# Patient Record
Sex: FEMALE | URBAN - METROPOLITAN AREA
[De-identification: names, ages, dates, MRNs, and addresses within clinical notes are randomized per-mention and may not be internally consistent; named-entity substitution may affect disease eponyms.]

---

## 2020-07-08 ENCOUNTER — OFFICE VISIT (OUTPATIENT)
Dept: URBAN - METROPOLITAN AREA TELEHEALTH 2 | Facility: TELEHEALTH | Age: 40
End: 2020-07-08
Payer: COMMERCIAL

## 2020-07-08 DIAGNOSIS — K21.0 GERD WITH ESOPHAGITIS: ICD-10-CM

## 2020-07-08 DIAGNOSIS — K59.01 CONSTIPATION BY DELAYED COLONIC TRANSIT: ICD-10-CM

## 2020-07-08 DIAGNOSIS — K92.1 HEMATOCHEZIA: ICD-10-CM

## 2020-07-08 DIAGNOSIS — B37.3 VAGINAL CANDIDIASIS: ICD-10-CM

## 2020-07-08 PROBLEM — 72934000: Status: ACTIVE | Noted: 2020-07-08

## 2020-07-08 PROBLEM — 449341000124102: Status: ACTIVE | Noted: 2020-07-08

## 2020-07-08 PROBLEM — 266433003: Status: ACTIVE | Noted: 2020-07-08

## 2020-07-08 PROCEDURE — 99205 OFFICE O/P NEW HI 60 MIN: CPT | Performed by: INTERNAL MEDICINE

## 2020-07-08 RX ORDER — HYDROCORTISONE ACETATE 25 MG/1
1 SUPPOSITORY SUPPOSITORY RECTAL TWICE A DAY
Qty: 60 | Refills: 3 | OUTPATIENT
Start: 2020-07-08 | End: 2020-11-04

## 2020-07-08 RX ORDER — FLUCONAZOLE 150 MG/1
1 TABLET TABLET ORAL DAILY
Qty: 1 | Refills: 3 | OUTPATIENT
Start: 2020-07-08 | End: 2020-07-12

## 2020-07-08 RX ORDER — LINACLOTIDE 72 UG/1
1 CAPSULE AT LEAST 30 MINUTES BEFORE THE FIRST MEAL OF THE DAY ON AN EMPTY STOMACH CAPSULE, GELATIN COATED ORAL ONCE A DAY
Qty: 90 | Refills: 3 | OUTPATIENT
Start: 2020-07-08 | End: 2021-07-03

## 2020-07-08 RX ORDER — PANTOPRAZOLE SODIUM 40 MG/1
1 TABLET TABLET, DELAYED RELEASE ORAL ONCE A DAY
Qty: 90 | Refills: 3 | OUTPATIENT
Start: 2020-07-08

## 2020-07-08 NOTE — HPI-OTHER HISTORIES
The pt has a history of lower abdominal pain with constipation for 4 months.  The pt notes that she has had problems with gas and bloating for several years.  She has taken stool softners, gas X and has noticed rectal bleeding. Her water intake is poor and she will skip breakfast and  will eat lunch on the go. She will eat a decent meal for dinner.

## 2020-10-13 ENCOUNTER — OFFICE VISIT (OUTPATIENT)
Dept: URBAN - METROPOLITAN AREA SURGERY CENTER 16 | Facility: SURGERY CENTER | Age: 40
End: 2020-10-13
Payer: COMMERCIAL

## 2020-10-13 DIAGNOSIS — R19.4 ALTERED BOWEL HABITS: ICD-10-CM

## 2020-10-13 PROCEDURE — G8907 PT DOC NO EVENTS ON DISCHARG: HCPCS | Performed by: INTERNAL MEDICINE

## 2020-10-13 PROCEDURE — G9937 DIG OR SURV COLSCO: HCPCS | Performed by: INTERNAL MEDICINE

## 2020-10-13 PROCEDURE — 45378 DIAGNOSTIC COLONOSCOPY: CPT | Performed by: INTERNAL MEDICINE

## 2020-11-17 ENCOUNTER — OFFICE VISIT (OUTPATIENT)
Dept: URBAN - METROPOLITAN AREA SURGERY CENTER 16 | Facility: SURGERY CENTER | Age: 40
End: 2020-11-17
Payer: COMMERCIAL

## 2020-11-17 DIAGNOSIS — K29.60 ADENOPAPILLOMATOSIS GASTRICA: ICD-10-CM

## 2020-11-17 DIAGNOSIS — K22.8 COLUMNAR-LINED ESOPHAGUS: ICD-10-CM

## 2020-11-17 PROCEDURE — G8907 PT DOC NO EVENTS ON DISCHARG: HCPCS | Performed by: INTERNAL MEDICINE

## 2020-11-17 PROCEDURE — 43239 EGD BIOPSY SINGLE/MULTIPLE: CPT | Performed by: INTERNAL MEDICINE

## 2020-11-20 ENCOUNTER — OFFICE VISIT (OUTPATIENT)
Dept: URBAN - METROPOLITAN AREA SURGERY CENTER 16 | Facility: SURGERY CENTER | Age: 40
End: 2020-11-20

## 2020-12-21 ENCOUNTER — OFFICE VISIT (OUTPATIENT)
Dept: URBAN - METROPOLITAN AREA CLINIC 17 | Facility: CLINIC | Age: 40
End: 2020-12-21

## 2021-01-13 ENCOUNTER — OFFICE VISIT (OUTPATIENT)
Dept: URBAN - METROPOLITAN AREA TELEHEALTH 2 | Facility: TELEHEALTH | Age: 41
End: 2021-01-13
Payer: COMMERCIAL

## 2021-01-13 DIAGNOSIS — K59.01 CONSTIPATION BY DELAYED COLONIC TRANSIT: ICD-10-CM

## 2021-01-13 DIAGNOSIS — R19.4 CHANGE IN BOWEL HABITS: ICD-10-CM

## 2021-01-13 DIAGNOSIS — K21.00 GASTROESOPHAGEAL REFLUX DISEASE WITH ESOPHAGITIS WITHOUT HEMORRHAGE: ICD-10-CM

## 2021-01-13 DIAGNOSIS — K92.89 GAS BLOAT SYNDROME: ICD-10-CM

## 2021-01-13 PROBLEM — 35298007: Status: ACTIVE | Noted: 2020-07-08

## 2021-01-13 PROCEDURE — G9903 PT SCRN TBCO ID AS NON USER: HCPCS | Performed by: INTERNAL MEDICINE

## 2021-01-13 PROCEDURE — 99213 OFFICE O/P EST LOW 20 MIN: CPT | Performed by: INTERNAL MEDICINE

## 2021-01-13 PROCEDURE — G8482 FLU IMMUNIZE ORDER/ADMIN: HCPCS | Performed by: INTERNAL MEDICINE

## 2021-01-13 PROCEDURE — G8420 CALC BMI NORM PARAMETERS: HCPCS | Performed by: INTERNAL MEDICINE

## 2021-01-13 PROCEDURE — 1036F TOBACCO NON-USER: CPT | Performed by: INTERNAL MEDICINE

## 2021-01-13 RX ORDER — LINACLOTIDE 72 UG/1
1 CAPSULE AT LEAST 30 MINUTES BEFORE THE FIRST MEAL OF THE DAY ON AN EMPTY STOMACH CAPSULE, GELATIN COATED ORAL ONCE A DAY
Qty: 90 | Refills: 3 | Status: ACTIVE | COMMUNITY
Start: 2020-07-08 | End: 2021-07-03

## 2021-01-13 RX ORDER — PANTOPRAZOLE SODIUM 40 MG/1
1 TABLET TABLET, DELAYED RELEASE ORAL ONCE A DAY
Qty: 90 | Refills: 3 | Status: ACTIVE | COMMUNITY
Start: 2020-07-08

## 2021-01-13 NOTE — HPI-OTHER HISTORIES
The pt presents for a f/u office visit as she notes that she is having heartburn and indigestion as well. She notes that the Linzess works well for constipation but she has not been taking the medication as regularly as she should. She denies melena, hematemesis or hematochezia.

## 2021-11-10 ENCOUNTER — OFFICE VISIT (OUTPATIENT)
Dept: URBAN - METROPOLITAN AREA TELEHEALTH 2 | Facility: TELEHEALTH | Age: 41
End: 2021-11-10

## 2022-05-18 ENCOUNTER — OFFICE VISIT (OUTPATIENT)
Dept: URBAN - METROPOLITAN AREA TELEHEALTH 2 | Facility: TELEHEALTH | Age: 42
End: 2022-05-18
Payer: COMMERCIAL

## 2022-05-18 DIAGNOSIS — R63.8 DIETARY INDISCRETION: ICD-10-CM

## 2022-05-18 DIAGNOSIS — Z12.11 SCREEN FOR COLON CANCER: ICD-10-CM

## 2022-05-18 DIAGNOSIS — K92.89 GAS BLOAT SYNDROME: ICD-10-CM

## 2022-05-18 DIAGNOSIS — E55.9 VITAMIN D DEFICIENCY DISEASE: ICD-10-CM

## 2022-05-18 DIAGNOSIS — K21.00 GASTROESOPHAGEAL REFLUX DISEASE WITH ESOPHAGITIS WITHOUT HEMORRHAGE: ICD-10-CM

## 2022-05-18 PROBLEM — 119292006: Status: ACTIVE | Noted: 2020-07-08

## 2022-05-18 PROBLEM — 34713006: Status: ACTIVE | Noted: 2021-01-13

## 2022-05-18 PROCEDURE — 99214 OFFICE O/P EST MOD 30 MIN: CPT | Performed by: INTERNAL MEDICINE

## 2022-05-18 RX ORDER — PANTOPRAZOLE SODIUM 40 MG/1
1 TABLET TABLET, DELAYED RELEASE ORAL ONCE A DAY
Qty: 90 | Refills: 3 | Status: ACTIVE | COMMUNITY
Start: 2020-07-08

## 2022-05-18 NOTE — HPI-TODAY'S VISIT:
Pt with history of GERD who presents for evaluation of LUQ pain for the several months. She has been drinking milkshakes. For lunch, she will have another shake. For dinner, the pt will eat baked chicken with salad, stir silva with tomatoes, beans, onions over a pasta. She will have a coke 0 and she will beer. The pt has trouble with constipation and she has not been exercising .on a regular basis and she notes that she is eating late at night.

## 2022-06-01 PROBLEM — 305058001: Status: ACTIVE | Noted: 2022-06-01

## 2022-06-01 PROBLEM — 405241002: Status: ACTIVE | Noted: 2022-06-01

## 2022-06-17 ENCOUNTER — TELEPHONE ENCOUNTER (OUTPATIENT)
Dept: URBAN - METROPOLITAN AREA CLINIC 17 | Facility: CLINIC | Age: 42
End: 2022-06-17

## 2022-06-17 RX ORDER — PANTOPRAZOLE SODIUM 40 MG/1
1 TABLET TABLET, DELAYED RELEASE ORAL ONCE A DAY
Qty: 90 | Refills: 3
Start: 2020-07-08

## 2022-06-29 ENCOUNTER — TELEPHONE ENCOUNTER (OUTPATIENT)
Dept: URBAN - METROPOLITAN AREA CLINIC 92 | Facility: CLINIC | Age: 42
End: 2022-06-29

## 2022-06-29 ENCOUNTER — OFFICE VISIT (OUTPATIENT)
Dept: URBAN - METROPOLITAN AREA SURGERY CENTER 16 | Facility: SURGERY CENTER | Age: 42
End: 2022-06-29
Payer: COMMERCIAL

## 2022-06-29 DIAGNOSIS — D12.3 ADENOMA OF TRANSVERSE COLON: ICD-10-CM

## 2022-06-29 DIAGNOSIS — Z12.11 COLON CANCER SCREENING: ICD-10-CM

## 2022-06-29 PROBLEM — 266433003: Status: ACTIVE | Noted: 2022-05-18

## 2022-06-29 PROCEDURE — 45380 COLONOSCOPY AND BIOPSY: CPT | Performed by: INTERNAL MEDICINE

## 2022-06-29 PROCEDURE — G8907 PT DOC NO EVENTS ON DISCHARG: HCPCS | Performed by: INTERNAL MEDICINE

## 2022-06-29 RX ORDER — PANTOPRAZOLE SODIUM 40 MG/1
1 TABLET TABLET, DELAYED RELEASE ORAL ONCE A DAY
Qty: 90 | Refills: 3 | Status: ACTIVE | COMMUNITY
Start: 2020-07-08

## 2023-06-26 ENCOUNTER — OFFICE VISIT (OUTPATIENT)
Dept: URBAN - METROPOLITAN AREA TELEHEALTH 2 | Facility: TELEHEALTH | Age: 43
End: 2023-06-26
Payer: COMMERCIAL

## 2023-06-26 DIAGNOSIS — K57.30 DIVERTICULAR DISEASE OF COLON: ICD-10-CM

## 2023-06-26 DIAGNOSIS — E66.01 MORBID OBESITY: ICD-10-CM

## 2023-06-26 DIAGNOSIS — K59.09 CHRONIC CONSTIPATION: ICD-10-CM

## 2023-06-26 PROBLEM — 733657002: Status: ACTIVE | Noted: 2023-06-26

## 2023-06-26 PROBLEM — 78275009: Status: ACTIVE | Noted: 2023-06-26

## 2023-06-26 PROBLEM — 428283002: Status: ACTIVE | Noted: 2023-06-26

## 2023-06-26 PROBLEM — 238136002: Status: ACTIVE | Noted: 2023-06-26

## 2023-06-26 PROCEDURE — 95800 SLP STDY UNATTENDED: CPT | Performed by: INTERNAL MEDICINE

## 2023-06-26 PROCEDURE — 95805 MULTIPLE SLEEP LATENCY TEST: CPT | Performed by: INTERNAL MEDICINE

## 2023-06-26 PROCEDURE — 95801 SLP STDY UNATND W/ANAL: CPT | Performed by: INTERNAL MEDICINE

## 2023-06-26 PROCEDURE — 99214 OFFICE O/P EST MOD 30 MIN: CPT | Performed by: INTERNAL MEDICINE

## 2023-06-26 PROCEDURE — 95806 SLEEP STUDY UNATT&RESP EFFT: CPT | Performed by: INTERNAL MEDICINE

## 2023-06-26 RX ORDER — PANTOPRAZOLE SODIUM 40 MG/1
1 TABLET TABLET, DELAYED RELEASE ORAL ONCE A DAY
Qty: 90 | Refills: 3 | Status: ACTIVE | COMMUNITY
Start: 2020-07-08

## 2023-06-26 RX ORDER — LUBIPROSTONE 8 UG/1
1 CAPSULE WITH FOOD AND WATER CAPSULE, GELATIN COATED ORAL TWICE A DAY
Qty: 180 CAPSULE | Refills: 3 | OUTPATIENT
Start: 2023-06-26 | End: 2024-06-20

## 2023-06-26 NOTE — HPI-TODAY'S VISIT:
The patient with a history of obesity, diverticular disease of the colon, GERD and non-compliance who presetn for f/u evaluation. The pt has struggled with constipation and bowel irregularity and non-compliance and her last colon + TA. The pt notes that she does not spend time exercising and she notes that she has been helping her grandmother and does not take time out for herself.

## 2024-04-18 ENCOUNTER — OV EP (OUTPATIENT)
Dept: URBAN - METROPOLITAN AREA CLINIC 92 | Facility: CLINIC | Age: 44
End: 2024-04-18
Payer: COMMERCIAL

## 2024-04-18 VITALS
TEMPERATURE: 97.1 F | HEIGHT: 67 IN | SYSTOLIC BLOOD PRESSURE: 140 MMHG | WEIGHT: 267 LBS | DIASTOLIC BLOOD PRESSURE: 83 MMHG | HEART RATE: 70 BPM | BODY MASS INDEX: 41.91 KG/M2

## 2024-04-18 DIAGNOSIS — R10.12 LEFT UPPER QUADRANT PAIN: ICD-10-CM

## 2024-04-18 DIAGNOSIS — R14.0 POSTPRANDIAL BLOATING: ICD-10-CM

## 2024-04-18 DIAGNOSIS — K59.04 CHRONIC IDIOPATHIC CONSTIPATION: ICD-10-CM

## 2024-04-18 DIAGNOSIS — R10.32 LEFT LOWER QUADRANT PAIN: ICD-10-CM

## 2024-04-18 DIAGNOSIS — K21.00 GASTROESOPHAGEAL REFLUX DISEASE WITH ESOPHAGITIS WITHOUT HEMORRHAGE: ICD-10-CM

## 2024-04-18 DIAGNOSIS — D25.9 UTERINE LEIOMYOMA, UNSPECIFIED LOCATION: ICD-10-CM

## 2024-04-18 DIAGNOSIS — Z86.010 PERSONAL HISTORY OF COLONIC POLYPS: ICD-10-CM

## 2024-04-18 DIAGNOSIS — K62.5 RECTAL BLEEDING: ICD-10-CM

## 2024-04-18 PROBLEM — 82934008: Status: ACTIVE | Noted: 2024-04-18

## 2024-04-18 PROCEDURE — 99214 OFFICE O/P EST MOD 30 MIN: CPT

## 2024-04-18 RX ORDER — PLECANATIDE 3 MG/1
1 TABLET TABLET ORAL ONCE A DAY
Qty: 90 TABLET | Refills: 0 | OUTPATIENT
Start: 2024-04-18 | End: 2024-07-17

## 2024-04-18 RX ORDER — POLYETHYLENE GLYCOL 3350, SODIUM SULFATE ANHYDROUS, SODIUM BICARBONATE, SODIUM CHLORIDE, POTASSIUM CHLORIDE 236; 22.74; 6.74; 5.86; 2.97 G/4L; G/4L; G/4L; G/4L; G/4L
AS DIRECTED POWDER, FOR SOLUTION ORAL 1
Qty: 1 | Refills: 0 | OUTPATIENT
Start: 2024-04-18 | End: 2024-04-19

## 2024-04-18 RX ORDER — PANTOPRAZOLE SODIUM 40 MG/1
1 TABLET TABLET, DELAYED RELEASE ORAL ONCE A DAY
Qty: 90 | Refills: 3 | Status: ON HOLD | COMMUNITY
Start: 2020-07-08

## 2024-04-18 RX ORDER — LUBIPROSTONE 8 UG/1
1 CAPSULE WITH FOOD AND WATER CAPSULE, GELATIN COATED ORAL TWICE A DAY
Qty: 180 CAPSULE | Refills: 3 | Status: ON HOLD | COMMUNITY
Start: 2023-06-26 | End: 2024-06-20

## 2024-04-18 RX ORDER — PANTOPRAZOLE SODIUM 40 MG/1
1 TABLET TABLET, DELAYED RELEASE ORAL ONCE A DAY
Qty: 90 TABLET | Refills: 0 | OUTPATIENT
Start: 2024-04-18

## 2024-04-18 NOTE — PHYSICAL EXAM GASTROINTESTINAL
Abdomen,  soft, LLQ and LUQ mild ttp nondistended,  no guarding or rigidity,  no masses palpable,  normal bowel sounds Liver and Spleen,no hepatosplenomegaly

## 2024-04-18 NOTE — HPI-TODAY'S VISIT:
43-year-old female presents today for abd pain.  She is a previous patient of Dr. Young she has a history of abdominal pain and constipation.  She has used Linzess 72 mcg in the past.  She also has history of GERD Last colonoscopy was 6/2022 this demonstrated internal hemorrhoids, 11 mm tubular adenoma was recommended to repeat in 3 years EGD  demonstrated LA grade a esophagitis, small hiatal hernia, chronic gastritis no HP or IM  Patient states that for the past few years she has been having on and off left upper abdominal and left lower quadrant abdominal pain but recently the last 4 months this is worsened.  She states her pain is more constant and daily.  She has associated nausea but no vomiting.  She does have postprandial fullness and has to make herself eat.  she has tried to make dietary changes but this has not helped.  There are no aggravating or alleviating factors.  She did have a CT scan ordered by her primary care earlier this year unsure if this was abdomen and pelvis and this did demonstrate uterine fibroids.  Due to this an MRI of the pelvis was ordered which again demonstrated fibroids that she does have a follow-up visit with GYN at the end of May.  She has not seen a GYN in several years.  She does have heavy menstrual cycles. She has history of chronic constipation and typically has a bowel movement every few days.  If she remembers to take her MiraLAX this does help her bowel movements.  She notes that while she was on Linzess 72 of the past she had to discontinue this due to urgency. She never got the Amitiza prescription from last office visit.  She did note an episode of rectal bleeding 3-4 weeks ago and does have history of hemorrhoids.  She started herself on a natural cream however broke out in hives after this.  She discontinued the cream.  She has been taking MiraLAX more regularly since and this did resolve bleeding.  She is having rectal pain however this resolved as well. She has history of acid reflux but this is currently managed if she sleeps upright.  She wants to take her pantoprazole every few weeks.  She denies any dysphagia or odynophagia.  She does not use any NSAIDs.  She has not any family history of any GI related cancers or conditions.

## 2024-05-24 ENCOUNTER — OUT OF OFFICE VISIT (OUTPATIENT)
Dept: URBAN - METROPOLITAN AREA SURGERY CENTER 16 | Facility: SURGERY CENTER | Age: 44
End: 2024-05-24
Payer: COMMERCIAL

## 2024-05-24 ENCOUNTER — OFFICE VISIT (OUTPATIENT)
Dept: URBAN - METROPOLITAN AREA SURGERY CENTER 16 | Facility: SURGERY CENTER | Age: 44
End: 2024-05-24

## 2024-05-24 DIAGNOSIS — K57.30 COLON, DIVERTICULOSIS: ICD-10-CM

## 2024-05-24 DIAGNOSIS — K29.70 CHRONIC GASTRITIS: ICD-10-CM

## 2024-05-24 PROCEDURE — 00813 ANES UPR LWR GI NDSC PX: CPT | Performed by: ANESTHESIOLOGIST ASSISTANT

## 2024-05-24 PROCEDURE — 00813 ANES UPR LWR GI NDSC PX: CPT | Performed by: ANESTHESIOLOGY

## 2024-06-17 ENCOUNTER — OFFICE VISIT (OUTPATIENT)
Dept: URBAN - METROPOLITAN AREA CLINIC 92 | Facility: CLINIC | Age: 44
End: 2024-06-17

## 2024-06-17 RX ORDER — PANTOPRAZOLE SODIUM 40 MG/1
1 TABLET TABLET, DELAYED RELEASE ORAL ONCE A DAY
Qty: 90 | Refills: 3 | Status: ON HOLD | COMMUNITY
Start: 2020-07-08

## 2024-06-17 RX ORDER — LUBIPROSTONE 8 UG/1
1 CAPSULE WITH FOOD AND WATER CAPSULE, GELATIN COATED ORAL TWICE A DAY
Qty: 180 CAPSULE | Refills: 3 | Status: ON HOLD | COMMUNITY
Start: 2023-06-26 | End: 2024-06-20

## 2024-06-17 RX ORDER — PLECANATIDE 3 MG/1
1 TABLET TABLET ORAL ONCE A DAY
Qty: 90 TABLET | Refills: 0 | Status: ACTIVE | COMMUNITY
Start: 2024-04-18 | End: 2024-07-17

## 2024-06-17 RX ORDER — PANTOPRAZOLE SODIUM 40 MG/1
1 TABLET TABLET, DELAYED RELEASE ORAL ONCE A DAY
Qty: 90 TABLET | Refills: 0 | Status: ACTIVE | COMMUNITY
Start: 2024-04-18

## 2024-06-28 ENCOUNTER — DASHBOARD ENCOUNTERS (OUTPATIENT)
Age: 44
End: 2024-06-28

## 2024-07-05 ENCOUNTER — OFFICE VISIT (OUTPATIENT)
Dept: URBAN - METROPOLITAN AREA CLINIC 92 | Facility: CLINIC | Age: 44
End: 2024-07-05

## 2024-07-05 RX ORDER — PANTOPRAZOLE SODIUM 40 MG/1
1 TABLET TABLET, DELAYED RELEASE ORAL ONCE A DAY
Qty: 90 TABLET | Refills: 0 | Status: ACTIVE | COMMUNITY
Start: 2024-04-18

## 2024-07-05 RX ORDER — PLECANATIDE 3 MG/1
1 TABLET TABLET ORAL ONCE A DAY
Qty: 90 TABLET | Refills: 0 | Status: ACTIVE | COMMUNITY
Start: 2024-04-18 | End: 2024-07-17

## 2024-07-05 RX ORDER — PANTOPRAZOLE SODIUM 40 MG/1
1 TABLET TABLET, DELAYED RELEASE ORAL ONCE A DAY
Qty: 90 TABLET | Refills: 0 | OUTPATIENT

## 2024-07-05 RX ORDER — PLECANATIDE 3 MG/1
1 TABLET TABLET ORAL ONCE A DAY
Qty: 90 TABLET | Refills: 0 | OUTPATIENT

## 2024-07-05 RX ORDER — PANTOPRAZOLE SODIUM 40 MG/1
1 TABLET TABLET, DELAYED RELEASE ORAL ONCE A DAY
Qty: 90 | Refills: 3 | Status: ON HOLD | COMMUNITY
Start: 2020-07-08

## 2024-07-05 NOTE — HPI-TODAY'S VISIT:
43-year-old female presents today for abd pain.  She is a previous patient of Dr. Young she has a history of abdominal pain and constipation.  She has used Linzess 72 mcg in the past.  She also has history of GERD Last colonoscopy was 6/2022 this demonstrated internal hemorrhoids, 11 mm tubular adenoma was recommended to repeat in 3 years EGD  demonstrated LA grade a esophagitis, small hiatal hernia, chronic gastritis no HP or IM  Patient states that for the past few years she has been having on and off left upper abdominal and left lower quadrant abdominal pain but recently the last 4 months this is worsened.  She states her pain is more constant and daily.  She has associated nausea but no vomiting.  She does have postprandial fullness and has to make herself eat.  she has tried to make dietary changes but this has not helped.  There are no aggravating or alleviating factors.  She did have a CT scan ordered by her primary care earlier this year unsure if this was abdomen and pelvis and this did demonstrate uterine fibroids.  Due to this an MRI of the pelvis was ordered which again demonstrated fibroids that she does have a follow-up visit with GYN at the end of May.  She has not seen a GYN in several years.  She does have heavy menstrual cycles. She has history of chronic constipation and typically has a bowel movement every few days.  If she remembers to take her MiraLAX this does help her bowel movements.  She notes that while she was on Linzess 72 of the past she had to discontinue this due to urgency. She never got the Amitiza prescription from last office visit.  She did note an episode of rectal bleeding 3-4 weeks ago and does have history of hemorrhoids.  She started herself on a natural cream however broke out in hives after this.  She discontinued the cream.  She has been taking MiraLAX more regularly since and this did resolve bleeding.  She is having rectal pain however this resolved as well. She has history of acid reflux but this is currently managed if she sleeps upright.  She wants to take her pantoprazole every few weeks.  She denies any dysphagia or odynophagia.  She does not use any NSAIDs.  She has not any family history of any GI related cancers or conditions. Colonoscopy 5- demonstrated internal hemorrhoids, diverticula in left colon otherwise no abnormalities.  Endoscopy same day demonstrated erythematous mucosa in gastric antrum and body She was told to start Trulance for her constipation.  She was also started on pantoprazole 40 mg. MRI 2-8-2024 demonstrated markedly enlarged uterus with numerous fibroids, submucosal lesion at the fundus has mildly different imaging properties and the possibility of superimposed endometrial neoplasm is not included consider a sonohysterography CT scan 9-2023 demonstrated enlarged uterus with numerous uterine masses and fibroids, small fat-containing umbilical hernia.  She saw GYN May 20, 2024 during her Pap she was found to have ASCUS but negative HPV she was recommended to repeat her Pap smear in 3 years.  She was referred to IR to discuss UFE note from IR is not signed.

## 2024-07-22 ENCOUNTER — ERX REFILL RESPONSE (OUTPATIENT)
Dept: URBAN - METROPOLITAN AREA CLINIC 92 | Facility: CLINIC | Age: 44
End: 2024-07-22

## 2024-07-22 RX ORDER — PANTOPRAZOLE 40 MG/1
TAKE 1 TABLET BY MOUTH DAILY TABLET, DELAYED RELEASE ORAL
Qty: 90 TABLET | Refills: 0 | OUTPATIENT

## 2024-07-22 RX ORDER — PANTOPRAZOLE SODIUM 40 MG/1
1 TABLET TABLET, DELAYED RELEASE ORAL ONCE A DAY
Qty: 90 TABLET | Refills: 0 | OUTPATIENT

## 2024-07-22 RX ORDER — PLECANATIDE 3 MG/1
1 TABLET TABLET ORAL ONCE A DAY
Qty: 90 TABLET | Refills: 0 | OUTPATIENT

## 2024-07-22 RX ORDER — PLECANATIDE 3 MG/1
TAKE 1 TABLET BY MOUTH DAILY TABLET ORAL
Qty: 90 TABLET | Refills: 0 | OUTPATIENT

## 2024-07-30 ENCOUNTER — TELEPHONE ENCOUNTER (OUTPATIENT)
Dept: URBAN - METROPOLITAN AREA CLINIC 92 | Facility: CLINIC | Age: 44
End: 2024-07-30

## 2024-07-31 ENCOUNTER — OFFICE VISIT (OUTPATIENT)
Dept: URBAN - METROPOLITAN AREA CLINIC 92 | Facility: CLINIC | Age: 44
End: 2024-07-31
Payer: COMMERCIAL

## 2024-07-31 VITALS
TEMPERATURE: 96.5 F | DIASTOLIC BLOOD PRESSURE: 94 MMHG | HEIGHT: 67 IN | SYSTOLIC BLOOD PRESSURE: 145 MMHG | BODY MASS INDEX: 42.06 KG/M2 | HEART RATE: 82 BPM | WEIGHT: 268 LBS

## 2024-07-31 DIAGNOSIS — Z86.010 PERSONAL HISTORY OF COLONIC POLYPS: ICD-10-CM

## 2024-07-31 DIAGNOSIS — R10.12 LEFT UPPER QUADRANT PAIN: ICD-10-CM

## 2024-07-31 DIAGNOSIS — K59.04 CHRONIC IDIOPATHIC CONSTIPATION: ICD-10-CM

## 2024-07-31 DIAGNOSIS — K62.5 RECTAL BLEEDING: ICD-10-CM

## 2024-07-31 DIAGNOSIS — R14.0 POSTPRANDIAL BLOATING: ICD-10-CM

## 2024-07-31 DIAGNOSIS — K21.00 GASTROESOPHAGEAL REFLUX DISEASE WITH ESOPHAGITIS WITHOUT HEMORRHAGE: ICD-10-CM

## 2024-07-31 DIAGNOSIS — R10.32 LEFT LOWER QUADRANT PAIN: ICD-10-CM

## 2024-07-31 DIAGNOSIS — D25.9 UTERINE LEIOMYOMA, UNSPECIFIED LOCATION: ICD-10-CM

## 2024-07-31 PROCEDURE — 99214 OFFICE O/P EST MOD 30 MIN: CPT

## 2024-07-31 RX ORDER — PANTOPRAZOLE SODIUM 40 MG/1
1 TABLET TABLET, DELAYED RELEASE ORAL ONCE A DAY
Qty: 90 TABLET | Refills: 0 | OUTPATIENT

## 2024-07-31 RX ORDER — PANTOPRAZOLE 40 MG/1
TAKE 1 TABLET BY MOUTH DAILY TABLET, DELAYED RELEASE ORAL
Qty: 90 TABLET | Refills: 0 | Status: ACTIVE | COMMUNITY

## 2024-07-31 RX ORDER — PLECANATIDE 3 MG/1
TAKE 1 TABLET BY MOUTH DAILY TABLET ORAL
Qty: 90 TABLET | Refills: 0 | Status: ACTIVE | COMMUNITY

## 2024-07-31 NOTE — HPI-TODAY'S VISIT:
4/18/24 43-year-old female presents today for abd pain.  She is a previous patient of Dr. Young she has a history of abdominal pain and constipation.  She has used Linzess 72 mcg in the past.  She also has history of GERD Last colonoscopy was 6/2022 this demonstrated internal hemorrhoids, 11 mm tubular adenoma was recommended to repeat in 3 years EGD  demonstrated LA grade a esophagitis, small hiatal hernia, chronic gastritis no HP or IM  Patient states that for the past few years she has been having on and off left upper abdominal and left lower quadrant abdominal pain but recently the last 4 months this is worsened.  She states her pain is more constant and daily.  She has associated nausea but no vomiting.  She does have postprandial fullness and has to make herself eat.  she has tried to make dietary changesbut this has not helped.  There are no aggravating or alleviating factors.  She did have a CT scan ordered by her primary care earlier this year unsure if this was abdomen and pelvis and this did demonstrate uterine fibroids.  Due to this an MRI of the pelvis was ordered which again demonstrated fibroids that she does have a follow-up visit with GYN at the end of May.  She has not seen a GYN in several years.  She does have heavy menstrual cycles. She has history of chronic constipation and typically has a bowel movement every few days.  If she remembers to take her MiraLAX this does help her bowel movements.  She notes that while she was on Linzess 72 of the past she had to discontinue this due to urgency. She never got the Amitiza prescription from last office visit.  She did note an episode of rectal bleeding 3-4 weeks ago and does have history of hemorrhoids.  She started herself on a natural cream however broke out in hives after this.  She discontinued the cream.  She has been taking MiraLAX more regularly since and this did resolve bleeding.  She is having rectal pain however this resolved as well. She has history of acid refluxbut this is currently managed if she sleeps upright.  She wants to take her pantoprazole every few weeks.  She denies any dysphagia or odynophagia.  She does not use any NSAIDs.  She has not any family history of any GI related cancers or conditions.  7/31/24 Colonoscopy 5- demonstrated internal hemorrhoids, diverticula in left colon otherwise no abnormalities.  Endoscopy same day demonstrated erythematous mucosa in gastric antrum and body bx showed chronic gastritis no HP or IM  She was told to start Trulance for her constipation has not been consistent with this. She cannot remember to take it.  She was also started on pantoprazole 40 mg also has not been taking this consistently.  MRI 2-8-2024 demonstrated markedly enlarged uterus with numerous fibroids, submucosal lesion at the fundus has mildly different imaging properties and the possibility of superimposed endometrial neoplasm is not included consider a sonohysterography CT scan 9-2023 demonstrated enlarged uterus with numerous uterine masses and fibroids, small fat-containing umbilical hernia.  She saw GYN May 20, 2024 during her Pap she was found to have ASCUS but negative HPV she was recommended to repeat her Pap smear in 3 years.  She was referred to IR to discuss UFE note she was not able to get this done due to active BVbut this was tx. She has not heard from them.

## 2024-09-11 ENCOUNTER — OFFICE VISIT (OUTPATIENT)
Dept: URBAN - METROPOLITAN AREA CLINIC 92 | Facility: CLINIC | Age: 44
End: 2024-09-11

## 2024-09-11 RX ORDER — PLECANATIDE 3 MG/1
TAKE 1 TABLET BY MOUTH DAILY TABLET ORAL
Qty: 90 TABLET | Refills: 0 | Status: ACTIVE | COMMUNITY

## 2024-09-11 RX ORDER — PANTOPRAZOLE SODIUM 40 MG/1
1 TABLET TABLET, DELAYED RELEASE ORAL ONCE A DAY
Qty: 90 TABLET | Refills: 0 | Status: ACTIVE | COMMUNITY

## 2024-09-11 RX ORDER — PANTOPRAZOLE 40 MG/1
TAKE 1 TABLET BY MOUTH DAILY TABLET, DELAYED RELEASE ORAL
Qty: 90 TABLET | Refills: 0 | Status: ACTIVE | COMMUNITY

## 2024-10-03 ENCOUNTER — OFFICE VISIT (OUTPATIENT)
Dept: URBAN - METROPOLITAN AREA CLINIC 92 | Facility: CLINIC | Age: 44
End: 2024-10-03

## 2024-10-03 RX ORDER — PANTOPRAZOLE SODIUM 40 MG/1
1 TABLET TABLET, DELAYED RELEASE ORAL ONCE A DAY
Qty: 90 TABLET | Refills: 0 | COMMUNITY

## 2024-10-03 RX ORDER — PANTOPRAZOLE SODIUM 40 MG/1
1 TABLET TABLET, DELAYED RELEASE ORAL ONCE A DAY
Qty: 90 TABLET | Refills: 0 | OUTPATIENT

## 2024-10-03 RX ORDER — PANTOPRAZOLE 40 MG/1
TAKE 1 TABLET BY MOUTH DAILY TABLET, DELAYED RELEASE ORAL
Qty: 90 TABLET | Refills: 0 | COMMUNITY

## 2024-10-03 RX ORDER — PLECANATIDE 3 MG/1
TAKE 1 TABLET BY MOUTH DAILY TABLET ORAL
Qty: 90 TABLET | Refills: 0 | COMMUNITY

## 2024-10-28 ENCOUNTER — OFFICE VISIT (OUTPATIENT)
Dept: URBAN - METROPOLITAN AREA CLINIC 92 | Facility: CLINIC | Age: 44
End: 2024-10-28
Payer: COMMERCIAL

## 2024-10-28 VITALS
HEART RATE: 112 BPM | HEIGHT: 67 IN | SYSTOLIC BLOOD PRESSURE: 135 MMHG | DIASTOLIC BLOOD PRESSURE: 76 MMHG | WEIGHT: 256 LBS | TEMPERATURE: 97.1 F | BODY MASS INDEX: 40.18 KG/M2

## 2024-10-28 DIAGNOSIS — R10.32 LEFT LOWER QUADRANT PAIN: ICD-10-CM

## 2024-10-28 DIAGNOSIS — R14.0 POSTPRANDIAL BLOATING: ICD-10-CM

## 2024-10-28 DIAGNOSIS — D25.9 UTERINE LEIOMYOMA, UNSPECIFIED LOCATION: ICD-10-CM

## 2024-10-28 DIAGNOSIS — K21.00 GASTROESOPHAGEAL REFLUX DISEASE WITH ESOPHAGITIS WITHOUT HEMORRHAGE: ICD-10-CM

## 2024-10-28 DIAGNOSIS — K59.04 CHRONIC IDIOPATHIC CONSTIPATION: ICD-10-CM

## 2024-10-28 DIAGNOSIS — K62.5 RECTAL BLEEDING: ICD-10-CM

## 2024-10-28 DIAGNOSIS — Z86.010 PERSONAL HISTORY OF COLONIC POLYPS: ICD-10-CM

## 2024-10-28 DIAGNOSIS — R10.12 LEFT UPPER QUADRANT PAIN: ICD-10-CM

## 2024-10-28 PROCEDURE — 99214 OFFICE O/P EST MOD 30 MIN: CPT

## 2024-10-28 RX ORDER — PLECANATIDE 3 MG/1
TAKE 1 TABLET BY MOUTH DAILY TABLET ORAL
Qty: 90 TABLET | Refills: 0 | Status: ON HOLD | COMMUNITY

## 2024-10-28 RX ORDER — LACTULOSE 10 G/15ML
15ML SOLUTION ORAL ONCE A DAY
Qty: 1350 ML | Refills: 3 | OUTPATIENT
Start: 2024-10-28 | End: 2025-10-23

## 2024-10-28 RX ORDER — PANTOPRAZOLE 40 MG/1
TAKE 1 TABLET BY MOUTH DAILY TABLET, DELAYED RELEASE ORAL
Qty: 90 TABLET | Refills: 0 | Status: ON HOLD | COMMUNITY

## 2024-10-28 RX ORDER — PANTOPRAZOLE SODIUM 40 MG/1
1 TABLET TABLET, DELAYED RELEASE ORAL ONCE A DAY
Qty: 90 TABLET | Refills: 0 | OUTPATIENT

## 2024-10-28 RX ORDER — PANTOPRAZOLE SODIUM 40 MG/1
1 TABLET TABLET, DELAYED RELEASE ORAL ONCE A DAY
Qty: 90 TABLET | Refills: 0 | Status: ACTIVE | COMMUNITY

## 2024-10-28 NOTE — HPI-TODAY'S VISIT:
4/18/24 43-year-old female presents today for abd pain.  She is a previous patient of Dr. Young she has a history of abdominal pain and constipation.  She has used Linzess 72 mcg in the past.  She also has history of GERD Last colonoscopy was 6/2022 this demonstrated internal hemorrhoids, 11 mm tubular adenoma was recommended to repeat in 3 years EGD  demonstrated LA grade a esophagitis, small hiatal hernia, chronic gastritis no HP or IM  Patient states that for the past few years she has been having on and off left upper abdominal and left lower quadrant abdominal pain but recently the last 4 months this is worsened.  She states her pain is more constant and daily.  She has associated nausea but no vomiting.  She does have postprandial fullness and has to make herself eat.  she has tried to make dietary changesbut this has not helped.  There are no aggravating or alleviating factors.  She did have a CT scan ordered by her primary care earlier this year unsure if this was abdomen and pelvis and this did demonstrate uterine fibroids.  Due to this an MRI of the pelvis was ordered which again demonstrated fibroids that she does have a follow-up visit with GYN at the end of May.  She has not seen a GYN in several years.  She does have heavy menstrual cycles. She has history of chronic constipation and typically has a bowel movement every few days.  If she remembers to take her MiraLAX this does help her bowel movements.  She notes that while she was on Linzess 72 of the past she had to discontinue this due to urgency. She never got the Amitiza prescription from last office visit.  She did note an episode of rectal bleeding 3-4 weeks ago and does have history of hemorrhoids.  She started herself on a natural cream however broke out in hives after this.  She discontinued the cream.  She has been taking MiraLAX more regularly since and this did resolve bleeding.  She is having rectal pain however this resolved as well. She has history of acid refluxbut this is currently managed if she sleeps upright.  She wants to take her pantoprazole every few weeks.  She denies any dysphagia or odynophagia.  She does not use any NSAIDs.  She has not any family history of any GI related cancers or conditions.  7/31/24 Colonoscopy 5- demonstrated internal hemorrhoids, diverticula in left colon otherwise no abnormalities.  Endoscopy same day demonstrated erythematous mucosa in gastric antrum and body bx showed chronic gastritis no HP or IM  She was told to start Trulance for her constipation has not been consistent with this. She cannot remember to take it.  She was also started on pantoprazole 40 mg also has not been taking this consistently.  MRI 2-8-2024 demonstrated markedly enlarged uterus with numerous fibroids, submucosal lesion at the fundus has mildly different imaging properties and the possibility of superimposed endometrial neoplasm is not included consider a sonohysterography CT scan 9-2023 demonstrated enlarged uterus with numerous uterine masses and fibroids, small fat-containing umbilical hernia.  She saw GYN May 20, 2024 during her Pap she was found to have ASCUS but negative HPV she was recommended to repeat her Pap smear in 3 years.  She was referred to IR to discuss UFE note she was not able to get this done due to active BVbut this was tx. She has not heard from them.  10/28/24 After our visit pt visited [facility]. Labs 10/25/24 showed hgb 11.6, hct 37.5, mcv 84.1, ferritin 48, iron 48, iron sat 17%. She still has not been able to do UFE. She is ch[place]wick for November  Trulance also caused diarrhea so she d/c this.  She is more consistent in taking ppi which helps her gerd sx.

## 2025-02-28 ENCOUNTER — OFFICE VISIT (OUTPATIENT)
Dept: URBAN - METROPOLITAN AREA CLINIC 92 | Facility: CLINIC | Age: 45
End: 2025-02-28

## 2025-02-28 RX ORDER — LACTULOSE 10 G/15ML
15ML SOLUTION ORAL ONCE A DAY
Qty: 1350 ML | Refills: 3 | OUTPATIENT

## 2025-02-28 RX ORDER — LACTULOSE 10 G/15ML
15ML SOLUTION ORAL ONCE A DAY
Qty: 1350 ML | Refills: 3 | Status: ACTIVE | COMMUNITY
Start: 2024-10-28 | End: 2025-10-23

## 2025-02-28 RX ORDER — PLECANATIDE 3 MG/1
TAKE 1 TABLET BY MOUTH DAILY TABLET ORAL
Qty: 90 TABLET | Refills: 0 | Status: ON HOLD | COMMUNITY

## 2025-02-28 RX ORDER — PANTOPRAZOLE 40 MG/1
TAKE 1 TABLET BY MOUTH DAILY TABLET, DELAYED RELEASE ORAL
Qty: 90 TABLET | Refills: 0 | Status: ON HOLD | COMMUNITY

## 2025-02-28 RX ORDER — PANTOPRAZOLE SODIUM 40 MG/1
1 TABLET TABLET, DELAYED RELEASE ORAL ONCE A DAY
Qty: 90 TABLET | Refills: 0 | Status: ACTIVE | COMMUNITY

## 2025-02-28 RX ORDER — PANTOPRAZOLE SODIUM 40 MG/1
1 TABLET TABLET, DELAYED RELEASE ORAL ONCE A DAY
Qty: 90 TABLET | Refills: 0 | OUTPATIENT

## 2025-02-28 NOTE — HPI-TODAY'S VISIT:
4/18/24 43-year-old female presents today for abd pain.  She is a previous patient of Dr. Young she has a history of abdominal pain and constipation.  She has used Linzess 72 mcg in the past.  She also has history of GERD Last colonoscopy was 6/2022 this demonstrated internal hemorrhoids, 11 mm tubular adenoma was recommended to repeat in 3 years EGD  demonstrated LA grade a esophagitis, small hiatal hernia, chronic gastritis no HP or IM  Patient states that for the past few years she has been having on and off left upper abdominal and left lower quadrant abdominal pain but recently the last 4 months this is worsened.  She states her pain is more constant and daily.  She has associated nausea but no vomiting.  She does have postprandial fullness and has to make herself eat.  she has tried to make dietary changesbut this has not helped.  There are no aggravating or alleviating factors.  She did have a CT scan ordered by her primary care earlier this year unsure if this was abdomen and pelvis and this did demonstrate uterine fibroids.  Due to this an MRI of the pelvis was ordered which again demonstrated fibroids that she does have a follow-up visit with GYN at the end of May.  She has not seen a GYN in several years.  She does have heavy menstrual cycles. She has history of chronic constipation and typically has a bowel movement every few days.  If she remembers to take her MiraLAX this does help her bowel movements.  She notes that while she was on Linzess 72 of the past she had to discontinue this due to urgency. She never got the Amitiza prescription from last office visit.  She did note an episode of rectal bleeding 3-4 weeks ago and does have history of hemorrhoids.  She started herself on a natural cream however broke out in hives after this.  She discontinued the cream.  She has been taking MiraLAX more regularly since and this did resolve bleeding.  She is having rectal pain however this resolved as well. She has history of acid refluxbut this is currently managed if she sleeps upright.  She wants to take her pantoprazole every few weeks.  She denies any dysphagia or odynophagia.  She does not use any NSAIDs.  She has not any family history of any GI related cancers or conditions.  7/31/24 Colonoscopy 5- demonstrated internal hemorrhoids, diverticula in left colon otherwise no abnormalities.  Endoscopy same day demonstrated erythematous mucosa in gastric antrum and body bx showed chronic gastritis no HP or IM  She was told to start Trulance for her constipation has not been consistent with this. She cannot remember to take it.  She was also started on pantoprazole 40 mg also has not been taking this consistently.  MRI 2-8-2024 demonstrated markedly enlarged uterus with numerous fibroids, submucosal lesion at the fundus has mildly different imaging properties and the possibility of superimposed endometrial neoplasm is not included consider a sonohysterography CT scan 9-2023 demonstrated enlarged uterus with numerous uterine masses and fibroids, small fat-containing umbilical hernia.  She saw GYN May 20, 2024 during her Pap she was found to have ASCUS but negative HPV she was recommended to repeat her Pap smear in 3 years.  She was referred to IR to discuss UFE note she was not able to get this done due to active BVbut this was tx. She has not heard from them.  10/28/24 After our visit pt visited [facility]. Labs 10/25/24 showed hgb 11.6, hct 37.5, mcv 84.1, ferritin 48, iron 48, iron sat 17%. She still has not been able to do UFE. She is ch[place]wick for November  Trulance also caused diarrhea so she d/c this.  She is more consistent in taking ppi which helps her gerd sx.

## 2025-04-04 ENCOUNTER — OFFICE VISIT (OUTPATIENT)
Dept: URBAN - METROPOLITAN AREA CLINIC 92 | Facility: CLINIC | Age: 45
End: 2025-04-04

## 2025-04-04 RX ORDER — PLECANATIDE 3 MG/1
TAKE 1 TABLET BY MOUTH DAILY TABLET ORAL
Qty: 90 TABLET | Refills: 0 | Status: ON HOLD | COMMUNITY

## 2025-04-04 RX ORDER — PANTOPRAZOLE SODIUM 40 MG/1
1 TABLET TABLET, DELAYED RELEASE ORAL ONCE A DAY
Qty: 90 TABLET | Refills: 0 | Status: ACTIVE | COMMUNITY

## 2025-04-04 RX ORDER — PANTOPRAZOLE 40 MG/1
TAKE 1 TABLET BY MOUTH DAILY TABLET, DELAYED RELEASE ORAL
Qty: 90 TABLET | Refills: 0 | Status: ON HOLD | COMMUNITY

## 2025-04-04 RX ORDER — LACTULOSE 10 G/15ML
15ML SOLUTION ORAL ONCE A DAY
Qty: 1350 ML | Refills: 3 | OUTPATIENT
Start: 2025-04-03

## 2025-04-04 RX ORDER — PANTOPRAZOLE SODIUM 40 MG/1
1 TABLET TABLET, DELAYED RELEASE ORAL ONCE A DAY
Qty: 90 TABLET | Refills: 0 | OUTPATIENT
Start: 2025-04-03

## 2025-04-04 RX ORDER — LACTULOSE 10 G/15ML
15ML SOLUTION ORAL ONCE A DAY
Qty: 1350 ML | Refills: 3 | Status: ACTIVE | COMMUNITY

## 2025-04-04 NOTE — HPI-TODAY'S VISIT:
4/18/24 43-year-old female presents today for abd pain.  She is a previous patient of Dr. Young she has a history of abdominal pain and constipation.  She has used Linzess 72 mcg in the past.  She also has history of GERD Last colonoscopy was 6/2022 this demonstrated internal hemorrhoids, 11 mm tubular adenoma was recommended to repeat in 3 years EGD  demonstrated LA grade a esophagitis, small hiatal hernia, chronic gastritis no HP or IM  Patient states that for the past few years she has been having on and off left upper abdominal and left lower quadrant abdominal pain but recently the last 4 months this is worsened.  She states her pain is more constant and daily.  She has associated nausea but no vomiting.  She does have postprandial fullness and has to make herself eat.  she has tried to make dietary changesbut this has not helped.  There are no aggravating or alleviating factors.  She did have a CT scan ordered by her primary care earlier this year unsure if this was abdomen and pelvis and this did demonstrate uterine fibroids.  Due to this an MRI of the pelvis was ordered which again demonstrated fibroids that she does have a follow-up visit with GYN at the end of May.  She has not seen a GYN in several years.  She does have heavy menstrual cycles. She has history of chronic constipation and typically has a bowel movement every few days.  If she remembers to take her MiraLAX this does help her bowel movements.  She notes that while she was on Linzess 72 of the past she had to discontinue this due to urgency. She never got the Amitiza prescription from last office visit.  She did note an episode of rectal bleeding 3-4 weeks ago and does have history of hemorrhoids.  She started herself on a natural cream however broke out in hives after this.  She discontinued the cream.  She has been taking MiraLAX more regularly since and this did resolve bleeding.  She is having rectal pain however this resolved as well. She has history of acid refluxbut this is currently managed if she sleeps upright.  She wants to take her pantoprazole every few weeks.  She denies any dysphagia or odynophagia.  She does not use any NSAIDs.  She has not any family history of any GI related cancers or conditions.  7/31/24 Colonoscopy 5- demonstrated internal hemorrhoids, diverticula in left colon otherwise no abnormalities.  Endoscopy same day demonstrated erythematous mucosa in gastric antrum and body bx showed chronic gastritis no HP or IM  She was told to start Trulance for her constipation has not been consistent with this. She cannot remember to take it.  She was also started on pantoprazole 40 mg also has not been taking this consistently.  MRI 2-8-2024 demonstrated markedly enlarged uterus with numerous fibroids, submucosal lesion at the fundus has mildly different imaging properties and the possibility of superimposed endometrial neoplasm is not included consider a sonohysterography CT scan 9-2023 demonstrated enlarged uterus with numerous uterine masses and fibroids, small fat-containing umbilical hernia.  She saw GYN May 20, 2024 during her Pap she was found to have ASCUS but negative HPV she was recommended to repeat her Pap smear in 3 years.  She was referred to IR to discuss UFE note she was not able to get this done due to active BVbut this was tx. She has not heard from them.  10/28/24 After our visit pt visited [facility]. Labs 10/25/24 showed hgb 11.6, hct 37.5, mcv 84.1, ferritin 48, iron 48, iron sat 17%. She still has not been able to do UFE. She is [place] for November  Trulance also caused diarrhea so she d/c this.  She is more consistent in taking ppi which helps her gerd sx.

## 2025-04-16 ENCOUNTER — OFFICE VISIT (OUTPATIENT)
Dept: URBAN - METROPOLITAN AREA CLINIC 92 | Facility: CLINIC | Age: 45
End: 2025-04-16
Payer: COMMERCIAL

## 2025-04-16 DIAGNOSIS — R10.12 LEFT UPPER QUADRANT PAIN: ICD-10-CM

## 2025-04-16 DIAGNOSIS — R14.0 POSTPRANDIAL BLOATING: ICD-10-CM

## 2025-04-16 DIAGNOSIS — Z86.0100 HISTORY OF COLON POLYPS: ICD-10-CM

## 2025-04-16 DIAGNOSIS — K21.00 GASTROESOPHAGEAL REFLUX DISEASE WITH ESOPHAGITIS WITHOUT HEMORRHAGE: ICD-10-CM

## 2025-04-16 DIAGNOSIS — R10.32 LEFT LOWER QUADRANT PAIN: ICD-10-CM

## 2025-04-16 DIAGNOSIS — D25.9 UTERINE LEIOMYOMA, UNSPECIFIED LOCATION: ICD-10-CM

## 2025-04-16 DIAGNOSIS — K59.04 CHRONIC IDIOPATHIC CONSTIPATION: ICD-10-CM

## 2025-04-16 DIAGNOSIS — K62.5 RECTAL BLEEDING: ICD-10-CM

## 2025-04-16 PROCEDURE — 99213 OFFICE O/P EST LOW 20 MIN: CPT

## 2025-04-16 RX ORDER — PANTOPRAZOLE SODIUM 40 MG/1
1 TABLET TABLET, DELAYED RELEASE ORAL ONCE A DAY
Qty: 90 TABLET | Refills: 0 | Status: ACTIVE | COMMUNITY
Start: 2025-04-03

## 2025-04-16 RX ORDER — PLECANATIDE 3 MG/1
TAKE 1 TABLET BY MOUTH DAILY TABLET ORAL
Qty: 90 TABLET | Refills: 0 | Status: ACTIVE | COMMUNITY

## 2025-04-16 RX ORDER — LACTULOSE 10 G/15ML
15ML SOLUTION ORAL ONCE A DAY
Qty: 1350 ML | Refills: 3 | Status: ACTIVE | COMMUNITY
Start: 2025-04-03

## 2025-04-16 RX ORDER — LACTULOSE 10 G/15ML
15ML SOLUTION ORAL ONCE A DAY
Qty: 1350 ML | Refills: 3 | OUTPATIENT
Start: 2025-04-16

## 2025-04-16 RX ORDER — PANTOPRAZOLE SODIUM 40 MG/1
1 TABLET TABLET, DELAYED RELEASE ORAL ONCE A DAY
Qty: 90 TABLET | Refills: 3 | OUTPATIENT
Start: 2025-04-16

## 2025-04-16 NOTE — HPI-TODAY'S VISIT:
4/18/24 43-year-old female presents today for abd pain.  She is a previous patient of Dr. Young she has a history of abdominal pain and constipation.  She has used Linzess 72 mcg in the past.  She also has history of GERD Last colonoscopy was 6/2022 this demonstrated internal hemorrhoids, 11 mm tubular adenoma was recommended to repeat in 3 years EGD  demonstrated LA grade a esophagitis, small hiatal hernia, chronic gastritis no HP or IM  Patient states that for the past few years she has been having on and off left upper abdominal and left lower quadrant abdominal pain but recently the last 4 months this is worsened.  She states her pain is more constant and daily.  She has associated nausea but no vomiting.  She does have postprandial fullness and has to make herself eat.  she has tried to make dietary changesbut this has not helped.  There are no aggravating or alleviating factors.  She did have a CT scan ordered by her primary care earlier this year unsure if this was abdomen and pelvis and this did demonstrate uterine fibroids.  Due to this an MRI of the pelvis was ordered which again demonstrated fibroids that she does have a follow-up visit with GYN at the end of May.  She has not seen a GYN in several years.  She does have heavy menstrual cycles. She has history of chronic constipation and typically has a bowel movement every few days.  If she remembers to take her MiraLAX this does help her bowel movements.  She notes that while she was on Linzess 72 of the past she had to discontinue this due to urgency. She never got the Amitiza prescription from last office visit.  She did note an episode of rectal bleeding 3-4 weeks ago and does have history of hemorrhoids.  She started herself on a natural cream however broke out in hives after this.  She discontinued the cream.  She has been taking MiraLAX more regularly since and this did resolve bleeding.  She is having rectal pain however this resolved as well. She has history of acid refluxbut this is currently managed if she sleeps upright.  She wants to take her pantoprazole every few weeks.  She denies any dysphagia or odynophagia.  She does not use any NSAIDs.  She has not any family history of any GI related cancers or conditions.  7/31/24 Colonoscopy 5- demonstrated internal hemorrhoids, diverticula in left colon otherwise no abnormalities.  Endoscopy same day demonstrated erythematous mucosa in gastric antrum and body bx showed chronic gastritis no HP or IM  She was told to start Trulance for her constipation has not been consistent with this. She cannot remember to take it.  She was also started on pantoprazole 40 mg also has not been taking this consistently.  MRI 2-8-2024 demonstrated markedly enlarged uterus with numerous fibroids, submucosal lesion at the fundus has mildly different imaging properties and the possibility of superimposed endometrial neoplasm is not included consider a sonohysterography CT scan 9-2023 demonstrated enlarged uterus with numerous uterine masses and fibroids, small fat-containing umbilical hernia.  She saw GYN May 20, 2024 during her Pap she was found to have ASCUS but negative HPV she was recommended to repeat her Pap smear in 3 years.  She was referred to IR to discuss UFE note she was not able to get this done due to active BVbut this was tx. She has not heard from them.  10/28/24 After our visit pt visited [facility]. Labs 10/25/24 showed hgb 11.6, hct 37.5, mcv 84.1, ferritin 48, iron 48, iron sat 17%. She still has not been able to do UFE. She is chadwick for November  Trulance also caused diarrhea so she d/c this.  She is more consistent in taking ppi which helps her gerd sx.  4/16/25 Since last visit she has been taking Lactaid pills daily instead of lactulose. She had thought I had said to do this. Still constipated. She had a UFE in nov 2024. Doing well but still has pelvic pain. GYN told her she had several large fibroids.

## 2025-06-06 ENCOUNTER — OFFICE VISIT (OUTPATIENT)
Dept: URBAN - METROPOLITAN AREA CLINIC 92 | Facility: CLINIC | Age: 45
End: 2025-06-06

## 2025-06-06 RX ORDER — PANTOPRAZOLE SODIUM 40 MG/1
1 TABLET TABLET, DELAYED RELEASE ORAL ONCE A DAY
Qty: 90 TABLET | Refills: 3 | OUTPATIENT
Start: 2025-06-05

## 2025-06-06 RX ORDER — LACTULOSE 10 G/15ML
15ML SOLUTION ORAL ONCE A DAY
Qty: 1350 ML | Refills: 3 | OUTPATIENT
Start: 2025-06-05

## 2025-06-06 RX ORDER — PANTOPRAZOLE SODIUM 40 MG/1
1 TABLET TABLET, DELAYED RELEASE ORAL ONCE A DAY
Qty: 90 TABLET | Refills: 3 | Status: ACTIVE | COMMUNITY
Start: 2025-04-16

## 2025-06-06 RX ORDER — PLECANATIDE 3 MG/1
TAKE 1 TABLET BY MOUTH DAILY TABLET ORAL
Qty: 90 TABLET | Refills: 0 | Status: ACTIVE | COMMUNITY

## 2025-06-06 RX ORDER — LACTULOSE 10 G/15ML
15ML SOLUTION ORAL ONCE A DAY
Qty: 1350 ML | Refills: 3 | Status: ACTIVE | COMMUNITY
Start: 2025-04-16

## 2025-06-06 NOTE — HPI-TODAY'S VISIT:
4/18/24 43-year-old female presents today for abd pain.  She is a previous patient of Dr. Young she has a history of abdominal pain and constipation.  She has used Linzess 72 mcg in the past.  She also has history of GERD Last colonoscopy was 6/2022 this demonstrated internal hemorrhoids, 11 mm tubular adenoma was recommended to repeat in 3 years EGD  demonstrated LA grade a esophagitis, small hiatal hernia, chronic gastritis no HP or IM  Patient states that for the past few years she has been having on and off left upper abdominal and left lower quadrant abdominal pain but recently the last 4 months this is worsened.  She states her pain is more constant and daily.  She has associated nausea but no vomiting.  She does have postprandial fullness and has to make herself eat.  she has tried to make dietary changesbut this has not helped.  There are no aggravating or alleviating factors.  She did have a CT scan ordered by her primary care earlier this year unsure if this was abdomen and pelvis and this did demonstrate uterine fibroids.  Due to this an MRI of the pelvis was ordered which again demonstrated fibroids that she does have a follow-up visit with GYN at the end of May.  She has not seen a GYN in several years.  She does have heavy menstrual cycles. She has history of chronic constipation and typically has a bowel movement every few days.  If she remembers to take her MiraLAX this does help her bowel movements.  She notes that while she was on Linzess 72 of the past she had to discontinue this due to urgency. She never got the Amitiza prescription from last office visit.  She did note an episode of rectal bleeding 3-4 weeks ago and does have history of hemorrhoids.  She started herself on a natural cream however broke out in hives after this.  She discontinued the cream.  She has been taking MiraLAX more regularly since and this did resolve bleeding.  She is having rectal pain however this resolved as well. She has history of acid refluxbut this is currently managed if she sleeps upright.  She wants to take her pantoprazole every few weeks.  She denies any dysphagia or odynophagia.  She does not use any NSAIDs.  She has not any family history of any GI related cancers or conditions.  7/31/24 Colonoscopy 5- demonstrated internal hemorrhoids, diverticula in left colon otherwise no abnormalities.  Endoscopy same day demonstrated erythematous mucosa in gastric antrum and body bx showed chronic gastritis no HP or IM  She was told to start Trulance for her constipation has not been consistent with this. She cannot remember to take it.  She was also started on pantoprazole 40 mg also has not been taking this consistently.  MRI 2-8-2024 demonstrated markedly enlarged uterus with numerous fibroids, submucosal lesion at the fundus has mildly different imaging properties and the possibility of superimposed endometrial neoplasm is not included consider a sonohysterography CT scan 9-2023 demonstrated enlarged uterus with numerous uterine masses and fibroids, small fat-containing umbilical hernia.  She saw GYN May 20, 2024 during her Pap she was found to have ASCUS but negative HPV she was recommended to repeat her Pap smear in 3 years.  She was referred to IR to discuss UFE note she was not able to get this done due to active BVbut this was tx. She has not heard from them.  10/28/24 After our visit pt visited [facility]. Labs 10/25/24 showed hgb 11.6, hct 37.5, mcv 84.1, ferritin 48, iron 48, iron sat 17%. She still has not been able to do UFE. She is chadwick for November  Trulance also caused diarrhea so she d/c this.  She is more consistent in taking ppi which helps her gerd sx.  4/16/25 Since last visit she has been taking Lactaid pills daily instead of lactulose. She had thought I had said to do this. Still constipated. She had a UFE in nov 2024. Doing well but still has pelvic pain. GYN told her she had several large fibroids.

## 2025-07-07 ENCOUNTER — OFFICE VISIT (OUTPATIENT)
Dept: URBAN - METROPOLITAN AREA CLINIC 92 | Facility: CLINIC | Age: 45
End: 2025-07-07
Payer: COMMERCIAL

## 2025-07-07 DIAGNOSIS — R10.12 LEFT UPPER QUADRANT PAIN: ICD-10-CM

## 2025-07-07 DIAGNOSIS — Z86.0100 HISTORY OF COLON POLYPS: ICD-10-CM

## 2025-07-07 DIAGNOSIS — R10.11 RIGHT UPPER QUADRANT PAIN: ICD-10-CM

## 2025-07-07 DIAGNOSIS — R10.32 LEFT LOWER QUADRANT PAIN: ICD-10-CM

## 2025-07-07 DIAGNOSIS — K21.00 GASTROESOPHAGEAL REFLUX DISEASE WITH ESOPHAGITIS WITHOUT HEMORRHAGE: ICD-10-CM

## 2025-07-07 DIAGNOSIS — K59.04 CHRONIC IDIOPATHIC CONSTIPATION: ICD-10-CM

## 2025-07-07 DIAGNOSIS — K62.5 RECTAL BLEEDING: ICD-10-CM

## 2025-07-07 DIAGNOSIS — K64.8 INTERNAL HEMORRHOIDS: ICD-10-CM

## 2025-07-07 DIAGNOSIS — D25.9 UTERINE LEIOMYOMA, UNSPECIFIED LOCATION: ICD-10-CM

## 2025-07-07 DIAGNOSIS — R14.0 POSTPRANDIAL BLOATING: ICD-10-CM

## 2025-07-07 PROCEDURE — 99214 OFFICE O/P EST MOD 30 MIN: CPT

## 2025-07-07 RX ORDER — LACTULOSE 10 G/15ML
15ML SOLUTION ORAL ONCE A DAY
Qty: 1350 ML | Refills: 3 | Status: ACTIVE | COMMUNITY
Start: 2025-06-05

## 2025-07-07 RX ORDER — TRANEXAMIC ACID 650 MG/1
AS DIRECTED TABLET, FILM COATED ORAL
Status: ACTIVE | COMMUNITY

## 2025-07-07 RX ORDER — LACTULOSE 10 G/15ML
15ML SOLUTION ORAL ONCE A DAY
Qty: 1350 ML | Refills: 3 | OUTPATIENT
Start: 2025-07-03

## 2025-07-07 RX ORDER — PANTOPRAZOLE SODIUM 40 MG/1
1 TABLET TABLET, DELAYED RELEASE ORAL ONCE A DAY
Qty: 90 TABLET | Refills: 3 | Status: ACTIVE | COMMUNITY
Start: 2025-06-05

## 2025-07-07 RX ORDER — HYDROCORTISONE 25 MG/G
1 APPLICATION CREAM TOPICAL TWICE A DAY
Qty: 1 | Refills: 3 | OUTPATIENT
Start: 2025-07-07 | End: 2025-09-01

## 2025-07-07 RX ORDER — METHIMAZOLE 10 MG/1
1 TABLET TABLET ORAL ONCE A DAY
Status: ACTIVE | COMMUNITY

## 2025-07-07 RX ORDER — PANTOPRAZOLE SODIUM 40 MG/1
1 TABLET TABLET, DELAYED RELEASE ORAL ONCE A DAY
Qty: 90 TABLET | Refills: 3 | OUTPATIENT
Start: 2025-07-03

## 2025-07-07 RX ORDER — PROPRANOLOL HYDROCHLORIDE 20 MG/1
1 TABLET TABLET ORAL TWICE A DAY
Status: ACTIVE | COMMUNITY

## 2025-07-07 RX ORDER — PLECANATIDE 3 MG/1
TAKE 1 TABLET BY MOUTH DAILY TABLET ORAL
Qty: 90 TABLET | Refills: 0 | Status: DISCONTINUED | COMMUNITY

## 2025-07-07 NOTE — HPI-TODAY'S VISIT:
4/18/24 43-year-old female presents today for abd pain.  She is a previous patient of Dr. Young she has a history of abdominal pain and constipation.  She has used Linzess 72 mcg in the past.  She also has history of GERD Last colonoscopy was 6/2022 this demonstrated internal hemorrhoids, 11 mm tubular adenoma was recommended to repeat in 3 years EGD  demonstrated LA grade a esophagitis, small hiatal hernia, chronic gastritis no HP or IM  Patient states that for the past few years she has been having on and off left upper abdominal and left lower quadrant abdominal pain but recently the last 4 months this is worsened.  She states her pain is more constant and daily.  She has associated nausea but no vomiting.  She does have postprandial fullness and has to make herself eat.  she has tried to make dietary changesbut this has not helped.  There are no aggravating or alleviating factors.  She did have a CT scan ordered by her primary care earlier this year unsure if this was abdomen and pelvis and this did demonstrate uterine fibroids.  Due to this an MRI of the pelvis was ordered which again demonstrated fibroids that she does have a follow-up visit with GYN at the end of May.  She has not seen a GYN in several years.  She does have heavy menstrual cycles. She has history of chronic constipation and typically has a bowel movement every few days.  If she remembers to take her MiraLAX this does help her bowel movements.  She notes that while she was on Linzess 72 of the past she had to discontinue this due to urgency. She never got the Amitiza prescription from last office visit.  She did note an episode of rectal bleeding 3-4 weeks ago and does have history of hemorrhoids.  She started herself on a natural cream however broke out in hives after this.  She discontinued the cream.  She has been taking MiraLAX more regularly since and this did resolve bleeding.  She is having rectal pain however this resolved as well. She has history of acid refluxbut this is currently managed if she sleeps upright.  She wants to take her pantoprazole every few weeks.  She denies any dysphagia or odynophagia.  She does not use any NSAIDs.  She has not any family history of any GI related cancers or conditions.  7/31/24 Colonoscopy 5- demonstrated internal hemorrhoids, diverticula in left colon otherwise no abnormalities.  Endoscopy same day demonstrated erythematous mucosa in gastric antrum and body bx showed chronic gastritis no HP or IM  She was told to start Trulance for her constipation has not been consistent with this. She cannot remember to take it.  She was also started on pantoprazole 40 mg also has not been taking this consistently.  MRI 2-8-2024 demonstrated markedly enlarged uterus with numerous fibroids, submucosal lesion at the fundus has mildly different imaging properties and the possibility of superimposed endometrial neoplasm is not included consider a sonohysterography CT scan 9-2023 demonstrated enlarged uterus with numerous uterine masses and fibroids, small fat-containing umbilical hernia.  She saw GYN May 20, 2024 during her Pap she was found to have ASCUS but negative HPV she was recommended to repeat her Pap smear in 3 years.  She was referred to IR to discuss UFE note she was not able to get this done due to active BVbut this was tx. She has not heard from them.  10/28/24 After our visit pt visited [facility]. Labs 10/25/24 showed hgb 11.6, hct 37.5, mcv 84.1, ferritin 48, iron 48, iron sat 17%. She still has not been able to do UFE. She is chadwick for November  Trulance also caused diarrhea so she d/c this.  She is more consistent in taking ppi which helps her gerd sx.  4/16/25 Since last visit she has been taking Lactaid pills daily instead of lactulose. She had thought I had said to do this. Still constipated. She had a UFE in nov 2024. Doing well but still has pelvic pain. GYN told her she had several large fibroids.  7/7/25 Has been taking lactulose when she is constipated. If she takes it daily has the diarrhea. Hemorhoids have been irritating her and using prep h which isnt helping.  She did have 1 iron infusion recently. Has noted RUQ abd pain. Drinks 2 shots daily of alcohol she has been doing this for 15 yrs. If she d/c alcohol her pain is better.

## 2025-07-21 ENCOUNTER — OFFICE VISIT (OUTPATIENT)
Dept: URBAN - METROPOLITAN AREA CLINIC 91 | Facility: CLINIC | Age: 45
End: 2025-07-21

## 2025-08-15 ENCOUNTER — OFFICE VISIT (OUTPATIENT)
Dept: URBAN - METROPOLITAN AREA CLINIC 91 | Facility: CLINIC | Age: 45
End: 2025-08-15
Payer: COMMERCIAL

## 2025-08-15 DIAGNOSIS — K82.4 GALLBLADDER POLYP: ICD-10-CM

## 2025-08-15 DIAGNOSIS — K76.0 HEPATIC STEATOSIS: ICD-10-CM

## 2025-08-15 PROCEDURE — 76705 ECHO EXAM OF ABDOMEN: CPT | Performed by: INTERNAL MEDICINE

## 2025-08-15 RX ORDER — PANTOPRAZOLE SODIUM 40 MG/1
1 TABLET TABLET, DELAYED RELEASE ORAL ONCE A DAY
Qty: 90 TABLET | Refills: 3 | Status: ACTIVE | COMMUNITY
Start: 2025-07-03

## 2025-08-15 RX ORDER — PROPRANOLOL HYDROCHLORIDE 20 MG/1
1 TABLET TABLET ORAL TWICE A DAY
Status: ACTIVE | COMMUNITY

## 2025-08-15 RX ORDER — HYDROCORTISONE 25 MG/G
1 APPLICATION CREAM TOPICAL TWICE A DAY
Qty: 1 | Refills: 3 | Status: ACTIVE | COMMUNITY
Start: 2025-07-07 | End: 2025-09-01

## 2025-08-15 RX ORDER — METHIMAZOLE 10 MG/1
1 TABLET TABLET ORAL ONCE A DAY
Status: ACTIVE | COMMUNITY

## 2025-08-15 RX ORDER — TRANEXAMIC ACID 650 MG/1
AS DIRECTED TABLET, FILM COATED ORAL
Status: ACTIVE | COMMUNITY

## 2025-08-15 RX ORDER — LACTULOSE 10 G/15ML
15ML SOLUTION ORAL ONCE A DAY
Qty: 1350 ML | Refills: 3 | Status: ACTIVE | COMMUNITY
Start: 2025-07-03